# Patient Record
Sex: FEMALE | Race: BLACK OR AFRICAN AMERICAN | NOT HISPANIC OR LATINO | Employment: FULL TIME | URBAN - METROPOLITAN AREA
[De-identification: names, ages, dates, MRNs, and addresses within clinical notes are randomized per-mention and may not be internally consistent; named-entity substitution may affect disease eponyms.]

---

## 2020-02-20 ENCOUNTER — TRANSCRIBE ORDERS (OUTPATIENT)
Dept: PHYSICAL THERAPY | Facility: CLINIC | Age: 50
End: 2020-02-20

## 2020-02-20 DIAGNOSIS — S46.919A STRAIN OF SHOULDER, UNSPECIFIED LATERALITY, INITIAL ENCOUNTER: ICD-10-CM

## 2020-02-20 DIAGNOSIS — S16.1XXA STRAIN OF NECK MUSCLE, INITIAL ENCOUNTER: Primary | ICD-10-CM

## 2020-02-24 ENCOUNTER — TREATMENT (OUTPATIENT)
Dept: PHYSICAL THERAPY | Facility: CLINIC | Age: 50
End: 2020-02-24

## 2020-02-24 DIAGNOSIS — M54.2 PAIN, NECK: Primary | ICD-10-CM

## 2020-02-24 DIAGNOSIS — M54.9 UPPER BACK PAIN ON LEFT SIDE: ICD-10-CM

## 2020-02-24 DIAGNOSIS — G44.52 NEW DAILY PERSISTENT HEADACHE: ICD-10-CM

## 2020-02-24 DIAGNOSIS — M79.604 LEG PAIN, ANTERIOR, RIGHT: ICD-10-CM

## 2020-02-24 PROCEDURE — 97162 PT EVAL MOD COMPLEX 30 MIN: CPT | Performed by: PHYSICAL THERAPIST

## 2020-02-24 PROCEDURE — 97014 ELECTRIC STIMULATION THERAPY: CPT | Performed by: PHYSICAL THERAPIST

## 2020-02-24 PROCEDURE — 97110 THERAPEUTIC EXERCISES: CPT | Performed by: PHYSICAL THERAPIST
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
## 2020-02-24 NOTE — PATIENT INSTRUCTIONS
Access Code: [email or web address]   URL: [email or web address]   Date: 02/24/2020   Prepared by: Jessenia Mcintosh     Exercises   Seated Scapular Retraction - 10 reps - 1 sets - 5 hold - 2x daily   Seated Upper Trapezius Stretch - 3 reps - 1 sets - 20 hold - 2x daily   Gentle Levator Scapulae Stretch - 3 reps - 1 sets - 20 hold - 2x daily   Modified Sidelying Thoracic Rotation - 10 reps - 1 sets - 10 hold - 2x daily   Supine Lower Trunk Rotation - 20 reps - 1 sets - 2x daily     Patient was educated on findings of evaluation, purpose of treatment, and goals for therapy.  Treatment options discussed and questions answered.  Patient was educated on exercises/self treatment/pain relief techniques.

## 2020-02-26 ENCOUNTER — TREATMENT (OUTPATIENT)
Dept: PHYSICAL THERAPY | Facility: CLINIC | Age: 50
End: 2020-02-26

## 2020-02-26 DIAGNOSIS — M54.9 UPPER BACK PAIN ON LEFT SIDE: ICD-10-CM

## 2020-02-26 DIAGNOSIS — M54.2 PAIN, NECK: Primary | ICD-10-CM

## 2020-02-26 PROCEDURE — 97014 ELECTRIC STIMULATION THERAPY: CPT | Performed by: PHYSICAL THERAPIST

## 2020-02-26 PROCEDURE — 97110 THERAPEUTIC EXERCISES: CPT | Performed by: PHYSICAL THERAPIST

## 2020-02-26 NOTE — PROGRESS NOTES
Physical Therapy Daily Progress Note      Visit # 2      Subjective Evaluation    History of Present Illness    Subjective comment: Pt stated her pain level wa a 7/10 in cervical spine and (L) trap.       Objective   See Exercise, Manual, and Modality Logs for complete treatment.   Added chin tucks    Assessment & Plan     Assessment  Assessment details: Pt tolerated treatment well.  Pt had increased discomfort in cervical spine and (L) upper trap, but was able to complete treatment.  Chin tucks were limited to 5 due to increased pain.  Plan to progress exercises as tolerated.  ( pt was 15 min late for appointment)                     Manual Therapy:   0     mins  68061;  Therapeutic Exercise:    25     mins  97915;     Neuromuscular Daya:    0    mins  60714;    Therapeutic Activity:     0     mins  80783;     Gait Trainin     mins  98978;     Ultrasound:     0     mins  11056;    Work Hardening           0      mins 68334  Iontophoresis               0   mins 46403    Timed Treatment:   25   mins   Total Treatment:     40   mins    Yasmany Hill PTA  Physical Therapist

## 2020-02-27 ENCOUNTER — TREATMENT (OUTPATIENT)
Dept: PHYSICAL THERAPY | Facility: CLINIC | Age: 50
End: 2020-02-27

## 2020-02-27 DIAGNOSIS — M54.9 UPPER BACK PAIN ON LEFT SIDE: ICD-10-CM

## 2020-02-27 DIAGNOSIS — M54.2 PAIN, NECK: Primary | ICD-10-CM

## 2020-02-27 DIAGNOSIS — M79.604 LEG PAIN, ANTERIOR, RIGHT: ICD-10-CM

## 2020-02-27 PROCEDURE — 97110 THERAPEUTIC EXERCISES: CPT | Performed by: PHYSICAL THERAPIST

## 2020-02-27 PROCEDURE — 97014 ELECTRIC STIMULATION THERAPY: CPT | Performed by: PHYSICAL THERAPIST

## 2020-02-27 NOTE — PROGRESS NOTES
Physical Therapy Daily Progress Note      Visit # 3      Subjective Evaluation    History of Present Illness    Subjective comment: Pt reports pain level is a 4/10 mostly on the (R) side. She had on pain when she woke up.  Pain started while in the shower.       Objective   See Exercise, Manual, and Modality Logs for complete treatment.   Added TBand row    Assessment & Plan     Assessment  Assessment details: Pt completed treatment with slight increase in pain in (L) scapula.  Pain started when doing scap retractions.  She tolerated addition of TBand rows with no issue.  Plan to progress strengthening as tolerated.                     Manual Therapy:    0     mins  23025;  Therapeutic Exercise:    30     mins  38765;     Neuromuscular Daya:    0    mins  73756;    Therapeutic Activity:     0     mins  65849;     Gait Trainin     mins  91937;     Ultrasound:     0     mins  08807;    Work Hardening           0      mins 51436  Iontophoresis               0   mins 83470    Timed Treatment:   30   mins   Total Treatment:     55   mins    Yasmany Hill PTA  Physical Therapist

## 2020-03-02 ENCOUNTER — TREATMENT (OUTPATIENT)
Dept: PHYSICAL THERAPY | Facility: CLINIC | Age: 50
End: 2020-03-02

## 2020-03-02 DIAGNOSIS — M79.604 LEG PAIN, ANTERIOR, RIGHT: ICD-10-CM

## 2020-03-02 DIAGNOSIS — M54.2 PAIN, NECK: Primary | ICD-10-CM

## 2020-03-02 DIAGNOSIS — M54.9 UPPER BACK PAIN ON LEFT SIDE: ICD-10-CM

## 2020-03-02 PROCEDURE — 97014 ELECTRIC STIMULATION THERAPY: CPT | Performed by: PHYSICAL THERAPIST

## 2020-03-02 PROCEDURE — 97110 THERAPEUTIC EXERCISES: CPT | Performed by: PHYSICAL THERAPIST

## 2020-03-02 NOTE — PROGRESS NOTES
Physical Therapy Daily Progress Note      Visit # 4      Subjective Evaluation    History of Present Illness    Subjective comment: Pt reports that she currently has a pain rating of 0/10.       Objective   See Exercise, Manual, and Modality Logs for complete treatment.   Added wall slides    Assessment & Plan     Assessment  Assessment details: Pt completed treatment with a slight increase in discomfort in upper trap.  She had complaints of pain in UT as she finished wall slides. Plan to progress strengthening as tolerated.                     Manual Therapy:    0     mins  03929;  Therapeutic Exercise:    40     mins  50959;     Neuromuscular Daya:    0    mins  09502;    Therapeutic Activity:     0     mins  16433;     Gait Trainin     mins  90607;     Ultrasound:     0     mins  28434;    Work Hardening           0      mins 62035  Iontophoresis               0   mins 99664    Timed Treatment:   40   mins   Total Treatment:     55   mins    Yasmany Hill PTA  Physical Therapist

## 2020-03-04 ENCOUNTER — TREATMENT (OUTPATIENT)
Dept: PHYSICAL THERAPY | Facility: CLINIC | Age: 50
End: 2020-03-04

## 2020-03-04 DIAGNOSIS — M79.604 LEG PAIN, ANTERIOR, RIGHT: ICD-10-CM

## 2020-03-04 DIAGNOSIS — M54.2 PAIN, NECK: Primary | ICD-10-CM

## 2020-03-04 DIAGNOSIS — M54.9 UPPER BACK PAIN ON LEFT SIDE: ICD-10-CM

## 2020-03-04 PROCEDURE — 97110 THERAPEUTIC EXERCISES: CPT | Performed by: PHYSICAL THERAPIST

## 2020-03-04 PROCEDURE — 97014 ELECTRIC STIMULATION THERAPY: CPT | Performed by: PHYSICAL THERAPIST

## 2020-03-04 NOTE — PROGRESS NOTES
Physical Therapy Daily Progress Note      Visit # 5      Subjective Evaluation    History of Present Illness    Subjective comment: Pt reports that she is feeling good today.  No headache this morning.       Objective   See Exercise, Manual, and Modality Logs for complete treatment.       Assessment & Plan     Assessment  Assessment details: Pt tolerated treatment well.  She did have a slight increase in pain when doing her prescribed exercise , but was able to complete them.  Overall she has had decrease in pain with therapeutic exercise over her 5 treatments.  Plan to progress as tolerated.                     Manual Therapy:    0     mins  42705;  Therapeutic Exercise:    23     mins  12810;     Neuromuscular Daya:    0    mins  77868;    Therapeutic Activity:     0     mins  97784;     Gait Trainin     mins  07551;     Ultrasound:     0     mins  57583;    Work Hardening           0      mins 95644  Iontophoresis               0   mins 26471    Timed Treatment:   23   mins   Total Treatment:     40   mins    Yasmany Hill PTA  Physical Therapist

## 2024-12-05 ENCOUNTER — HOSPITAL ENCOUNTER (OUTPATIENT)
Facility: HOSPITAL | Age: 54
Setting detail: OBSERVATION
LOS: 1 days | Discharge: HOME | End: 2024-12-07
Attending: INTERNAL MEDICINE | Admitting: HOSPITALIST
Payer: COMMERCIAL

## 2024-12-05 PROBLEM — K92.2 GI BLEED: Status: ACTIVE | Noted: 2024-12-05

## 2024-12-05 LAB
ABO GROUP BLD: NORMAL
ALBUMIN SERPL-MCNC: 3.6 G/DL (ref 3.5–5.2)
ALBUMIN/GLOB SERPL: 1.6 G/DL
ALP SERPL-CCNC: 57 U/L (ref 39–117)
ALT SERPL W P-5'-P-CCNC: 18 U/L (ref 1–33)
ANION GAP SERPL CALCULATED.3IONS-SCNC: 11 MMOL/L (ref 5–15)
AST SERPL-CCNC: 19 U/L (ref 1–32)
BASOPHILS # BLD AUTO: 0.04 10*3/MM3 (ref 0–0.2)
BASOPHILS NFR BLD AUTO: 0.3 % (ref 0–1.5)
BILIRUB SERPL-MCNC: 0.5 MG/DL (ref 0–1.2)
BLD GP AB SCN SERPL QL: NEGATIVE
BUN SERPL-MCNC: 41 MG/DL (ref 6–20)
BUN/CREAT SERPL: 15.6 (ref 7–25)
CALCIUM SPEC-SCNC: 8.4 MG/DL (ref 8.6–10.5)
CHLORIDE SERPL-SCNC: 107 MMOL/L (ref 98–107)
CO2 SERPL-SCNC: 21 MMOL/L (ref 22–29)
CREAT SERPL-MCNC: 2.62 MG/DL (ref 0.57–1)
DEPRECATED RDW RBC AUTO: 43.7 FL (ref 37–54)
EGFRCR SERPLBLD CKD-EPI 2021: 21 ML/MIN/1.73
EOSINOPHIL # BLD AUTO: 0.07 10*3/MM3 (ref 0–0.4)
EOSINOPHIL NFR BLD AUTO: 0.6 % (ref 0.3–6.2)
ERYTHROCYTE [DISTWIDTH] IN BLOOD BY AUTOMATED COUNT: 13.1 % (ref 12.3–15.4)
GLOBULIN UR ELPH-MCNC: 2.2 GM/DL
GLUCOSE BLDC GLUCOMTR-MCNC: 165 MG/DL (ref 70–130)
GLUCOSE BLDC GLUCOMTR-MCNC: 172 MG/DL (ref 70–130)
GLUCOSE SERPL-MCNC: 173 MG/DL (ref 65–99)
HCT VFR BLD AUTO: 29.6 % (ref 34–46.6)
HGB BLD-MCNC: 9.6 G/DL (ref 12–15.9)
IMM GRANULOCYTES # BLD AUTO: 0.09 10*3/MM3 (ref 0–0.05)
IMM GRANULOCYTES NFR BLD AUTO: 0.7 % (ref 0–0.5)
INR PPP: 1.19 (ref 0.9–1.1)
LYMPHOCYTES # BLD AUTO: 2.26 10*3/MM3 (ref 0.7–3.1)
LYMPHOCYTES NFR BLD AUTO: 18.1 % (ref 19.6–45.3)
MCH RBC QN AUTO: 29.6 PG (ref 26.6–33)
MCHC RBC AUTO-ENTMCNC: 32.4 G/DL (ref 31.5–35.7)
MCV RBC AUTO: 91.4 FL (ref 79–97)
MONOCYTES # BLD AUTO: 0.92 10*3/MM3 (ref 0.1–0.9)
MONOCYTES NFR BLD AUTO: 7.3 % (ref 5–12)
NEUTROPHILS NFR BLD AUTO: 73 % (ref 42.7–76)
NEUTROPHILS NFR BLD AUTO: 9.14 10*3/MM3 (ref 1.7–7)
NRBC BLD AUTO-RTO: 0 /100 WBC (ref 0–0.2)
PLATELET # BLD AUTO: 243 10*3/MM3 (ref 140–450)
PMV BLD AUTO: 10.9 FL (ref 6–12)
POTASSIUM SERPL-SCNC: 4.1 MMOL/L (ref 3.5–5.2)
PROT SERPL-MCNC: 5.8 G/DL (ref 6–8.5)
PROTHROMBIN TIME: 15.4 SECONDS (ref 11.7–14.2)
RBC # BLD AUTO: 3.24 10*6/MM3 (ref 3.77–5.28)
RH BLD: POSITIVE
SODIUM SERPL-SCNC: 139 MMOL/L (ref 136–145)
T&S EXPIRATION DATE: NORMAL
WBC NRBC COR # BLD AUTO: 12.52 10*3/MM3 (ref 3.4–10.8)

## 2024-12-05 PROCEDURE — 85025 COMPLETE CBC W/AUTO DIFF WBC: CPT | Performed by: HOSPITALIST

## 2024-12-05 PROCEDURE — 25810000003 SODIUM CHLORIDE 0.9 % SOLUTION: Performed by: HOSPITALIST

## 2024-12-05 PROCEDURE — 86850 RBC ANTIBODY SCREEN: CPT | Performed by: HOSPITALIST

## 2024-12-05 PROCEDURE — 63710000001 INSULIN GLARGINE PER 5 UNITS: Performed by: HOSPITALIST

## 2024-12-05 PROCEDURE — 99204 OFFICE O/P NEW MOD 45 MIN: CPT | Performed by: PHYSICIAN ASSISTANT

## 2024-12-05 PROCEDURE — 85610 PROTHROMBIN TIME: CPT | Performed by: HOSPITALIST

## 2024-12-05 PROCEDURE — 63710000001 INSULIN REGULAR HUMAN PER 5 UNITS: Performed by: HOSPITALIST

## 2024-12-05 PROCEDURE — 82948 REAGENT STRIP/BLOOD GLUCOSE: CPT

## 2024-12-05 PROCEDURE — 86900 BLOOD TYPING SEROLOGIC ABO: CPT | Performed by: HOSPITALIST

## 2024-12-05 PROCEDURE — 96374 THER/PROPH/DIAG INJ IV PUSH: CPT

## 2024-12-05 PROCEDURE — 86901 BLOOD TYPING SEROLOGIC RH(D): CPT | Performed by: HOSPITALIST

## 2024-12-05 PROCEDURE — G0378 HOSPITAL OBSERVATION PER HR: HCPCS

## 2024-12-05 PROCEDURE — 80053 COMPREHEN METABOLIC PANEL: CPT | Performed by: HOSPITALIST

## 2024-12-05 RX ORDER — HYDRALAZINE HYDROCHLORIDE 10 MG/1
100 TABLET, FILM COATED ORAL 2 TIMES DAILY
COMMUNITY

## 2024-12-05 RX ORDER — ONDANSETRON 4 MG/1
4 TABLET, ORALLY DISINTEGRATING ORAL EVERY 6 HOURS PRN
Status: DISCONTINUED | OUTPATIENT
Start: 2024-12-05 | End: 2024-12-07 | Stop reason: HOSPADM

## 2024-12-05 RX ORDER — ACETAMINOPHEN 160 MG/5ML
650 SOLUTION ORAL EVERY 4 HOURS PRN
Status: DISCONTINUED | OUTPATIENT
Start: 2024-12-05 | End: 2024-12-07 | Stop reason: HOSPADM

## 2024-12-05 RX ORDER — NITROGLYCERIN 0.4 MG/1
0.4 TABLET SUBLINGUAL
Status: DISCONTINUED | OUTPATIENT
Start: 2024-12-05 | End: 2024-12-07 | Stop reason: HOSPADM

## 2024-12-05 RX ORDER — SODIUM CHLORIDE 9 MG/ML
40 INJECTION, SOLUTION INTRAVENOUS AS NEEDED
Status: DISCONTINUED | OUTPATIENT
Start: 2024-12-05 | End: 2024-12-07 | Stop reason: HOSPADM

## 2024-12-05 RX ORDER — CARVEDILOL 12.5 MG/1
12.5 TABLET ORAL 2 TIMES DAILY WITH MEALS
Status: DISCONTINUED | OUTPATIENT
Start: 2024-12-05 | End: 2024-12-07 | Stop reason: HOSPADM

## 2024-12-05 RX ORDER — AMLODIPINE BESYLATE 10 MG/1
10 TABLET ORAL DAILY
Status: DISCONTINUED | OUTPATIENT
Start: 2024-12-05 | End: 2024-12-07 | Stop reason: HOSPADM

## 2024-12-05 RX ORDER — ONDANSETRON 2 MG/ML
4 INJECTION INTRAMUSCULAR; INTRAVENOUS EVERY 6 HOURS PRN
Status: DISCONTINUED | OUTPATIENT
Start: 2024-12-05 | End: 2024-12-07 | Stop reason: HOSPADM

## 2024-12-05 RX ORDER — DIPHENOXYLATE HYDROCHLORIDE AND ATROPINE SULFATE 2.5; .025 MG/1; MG/1
1 TABLET ORAL DAILY
COMMUNITY

## 2024-12-05 RX ORDER — ALLOPURINOL 100 MG/1
100 TABLET ORAL DAILY
COMMUNITY

## 2024-12-05 RX ORDER — PANTOPRAZOLE SODIUM 40 MG/10ML
40 INJECTION, POWDER, LYOPHILIZED, FOR SOLUTION INTRAVENOUS EVERY 12 HOURS SCHEDULED
Status: DISCONTINUED | OUTPATIENT
Start: 2024-12-05 | End: 2024-12-06

## 2024-12-05 RX ORDER — AMLODIPINE BESYLATE 10 MG/1
10 TABLET ORAL DAILY
COMMUNITY

## 2024-12-05 RX ORDER — LEVOTHYROXINE SODIUM 150 UG/1
150 TABLET ORAL
COMMUNITY

## 2024-12-05 RX ORDER — SODIUM CHLORIDE 9 MG/ML
100 INJECTION, SOLUTION INTRAVENOUS CONTINUOUS
Status: DISCONTINUED | OUTPATIENT
Start: 2024-12-05 | End: 2024-12-06

## 2024-12-05 RX ORDER — FUROSEMIDE 20 MG/1
20 TABLET ORAL DAILY
COMMUNITY

## 2024-12-05 RX ORDER — SPIRONOLACTONE 50 MG/1
50 TABLET, FILM COATED ORAL DAILY
COMMUNITY

## 2024-12-05 RX ORDER — ACETAMINOPHEN 325 MG/1
650 TABLET ORAL EVERY 4 HOURS PRN
Status: DISCONTINUED | OUTPATIENT
Start: 2024-12-05 | End: 2024-12-07 | Stop reason: HOSPADM

## 2024-12-05 RX ORDER — CARVEDILOL 12.5 MG/1
12.5 TABLET ORAL 2 TIMES DAILY WITH MEALS
COMMUNITY

## 2024-12-05 RX ORDER — ASPIRIN 81 MG/1
81 TABLET ORAL DAILY
Status: ON HOLD | COMMUNITY
End: 2024-12-07

## 2024-12-05 RX ORDER — ROSUVASTATIN CALCIUM 5 MG/1
5 TABLET, COATED ORAL DAILY
COMMUNITY

## 2024-12-05 RX ORDER — ROSUVASTATIN CALCIUM 5 MG/1
5 TABLET, COATED ORAL DAILY
Status: DISCONTINUED | OUTPATIENT
Start: 2024-12-05 | End: 2024-12-07 | Stop reason: HOSPADM

## 2024-12-05 RX ORDER — CALCITRIOL 0.5 UG/1
0.5 CAPSULE, LIQUID FILLED ORAL DAILY
Status: DISCONTINUED | OUTPATIENT
Start: 2024-12-05 | End: 2024-12-07 | Stop reason: HOSPADM

## 2024-12-05 RX ORDER — IBUPROFEN 600 MG/1
1 TABLET ORAL
Status: DISCONTINUED | OUTPATIENT
Start: 2024-12-05 | End: 2024-12-07 | Stop reason: HOSPADM

## 2024-12-05 RX ORDER — CYCLOBENZAPRINE HCL 10 MG
10 TABLET ORAL 3 TIMES DAILY PRN
Status: DISCONTINUED | OUTPATIENT
Start: 2024-12-05 | End: 2024-12-07 | Stop reason: HOSPADM

## 2024-12-05 RX ORDER — DEXTROSE MONOHYDRATE 25 G/50ML
25 INJECTION, SOLUTION INTRAVENOUS
Status: DISCONTINUED | OUTPATIENT
Start: 2024-12-05 | End: 2024-12-07 | Stop reason: HOSPADM

## 2024-12-05 RX ORDER — DULAGLUTIDE 0.75 MG/.5ML
0.75 INJECTION, SOLUTION SUBCUTANEOUS WEEKLY
COMMUNITY

## 2024-12-05 RX ORDER — ALLOPURINOL 100 MG/1
100 TABLET ORAL DAILY
Status: DISCONTINUED | OUTPATIENT
Start: 2024-12-05 | End: 2024-12-07 | Stop reason: HOSPADM

## 2024-12-05 RX ORDER — CALCITRIOL 0.5 UG/1
0.5 CAPSULE, LIQUID FILLED ORAL DAILY
COMMUNITY

## 2024-12-05 RX ORDER — ACETAMINOPHEN 650 MG/1
650 SUPPOSITORY RECTAL EVERY 4 HOURS PRN
Status: DISCONTINUED | OUTPATIENT
Start: 2024-12-05 | End: 2024-12-07 | Stop reason: HOSPADM

## 2024-12-05 RX ORDER — SODIUM CHLORIDE 0.9 % (FLUSH) 0.9 %
10 SYRINGE (ML) INJECTION AS NEEDED
Status: DISCONTINUED | OUTPATIENT
Start: 2024-12-05 | End: 2024-12-07 | Stop reason: HOSPADM

## 2024-12-05 RX ORDER — HYDRALAZINE HYDROCHLORIDE 50 MG/1
100 TABLET, FILM COATED ORAL 2 TIMES DAILY
Status: DISCONTINUED | OUTPATIENT
Start: 2024-12-05 | End: 2024-12-07 | Stop reason: HOSPADM

## 2024-12-05 RX ORDER — SODIUM CHLORIDE 0.9 % (FLUSH) 0.9 %
10 SYRINGE (ML) INJECTION EVERY 12 HOURS SCHEDULED
Status: DISCONTINUED | OUTPATIENT
Start: 2024-12-05 | End: 2024-12-07 | Stop reason: HOSPADM

## 2024-12-05 RX ORDER — LEVOTHYROXINE SODIUM 150 UG/1
150 TABLET ORAL
Status: DISCONTINUED | OUTPATIENT
Start: 2024-12-06 | End: 2024-12-07 | Stop reason: HOSPADM

## 2024-12-05 RX ORDER — NICOTINE POLACRILEX 4 MG
15 LOZENGE BUCCAL
Status: DISCONTINUED | OUTPATIENT
Start: 2024-12-05 | End: 2024-12-07 | Stop reason: HOSPADM

## 2024-12-05 RX ORDER — FLUTICASONE PROPIONATE 50 MCG
2 SPRAY, SUSPENSION (ML) NASAL AS NEEDED
COMMUNITY

## 2024-12-05 RX ORDER — CYCLOBENZAPRINE HCL 10 MG
10 TABLET ORAL 3 TIMES DAILY PRN
COMMUNITY

## 2024-12-05 RX ADMIN — HYDRALAZINE HYDROCHLORIDE 100 MG: 50 TABLET ORAL at 21:45

## 2024-12-05 RX ADMIN — INSULIN GLARGINE 10 UNITS: 100 INJECTION, SOLUTION SUBCUTANEOUS at 21:46

## 2024-12-05 RX ADMIN — ROSUVASTATIN CALCIUM 5 MG: 5 TABLET, FILM COATED ORAL at 18:11

## 2024-12-05 RX ADMIN — Medication 10 ML: at 21:46

## 2024-12-05 RX ADMIN — INSULIN HUMAN 2 UNITS: 100 INJECTION, SOLUTION PARENTERAL at 18:11

## 2024-12-05 RX ADMIN — CARVEDILOL 12.5 MG: 12.5 TABLET, FILM COATED ORAL at 16:37

## 2024-12-05 RX ADMIN — SODIUM CHLORIDE 100 ML/HR: 9 INJECTION, SOLUTION INTRAVENOUS at 16:38

## 2024-12-05 RX ADMIN — CALCITRIOL 0.5 MCG: 0.5 CAPSULE, LIQUID FILLED ORAL at 18:11

## 2024-12-05 RX ADMIN — ALLOPURINOL 100 MG: 100 TABLET ORAL at 16:37

## 2024-12-05 RX ADMIN — AMLODIPINE BESYLATE 10 MG: 10 TABLET ORAL at 16:37

## 2024-12-05 RX ADMIN — PANTOPRAZOLE SODIUM 40 MG: 40 INJECTION, POWDER, FOR SOLUTION INTRAVENOUS at 21:45

## 2024-12-05 RX ADMIN — MAGNESIUM OXIDE TAB 400 MG (241.3 MG ELEMENTAL MG) 400 MG: 400 (241.3 MG) TAB at 16:37

## 2024-12-05 NOTE — CONSULTS
"[facility]  Initial Inpatient Consult Note    Referring Provider: MD Wu  Reason for Consultation: Rectal bleeding    Subjective     History of present illness:    54 y.o. female  With a past medical history of hypertension, gout, hypothyroidism, gastroparesis, type 2 diabetes mellitus, MATI, CKD who presents as a transfer from [facility] in the setting of rectal bleeding.    Per reports from the outside hospital patient was found to be anemic.  Patient did have a syncopal episode while there and hit her head.  She did undergo CT head without IV contrast which was unremarkable.  On initial presentation to [facility] her hemoglobin was 11. Upon transfer hemoglobin was down to 7.6. Patient noted to have a mild leukocytosis.    Patient reports starting Tuesday she began experiencing bright red blood per rectum.  She reports this is intermittent throughout the day up until yesterday.  She does report some associated loose stools and abdominal cramping.  She describes it as a menstrual period like cramping.  She is not on any blood thinners.  The only new medication she has been taking is Trulicity.  She denies any ill contacts.  She denies any nausea, vomiting, hematemesis, melena.  Patient did receive a unit of PRBCs prior to transfer here to [facility].    Patient's last colonoscopy was in February of this year with Dr. Anderson.  Findings include moderate diverticulosis of the whole colon, multiple polyps throughout the ascending colon, cecum, sigmoid colon. Per report  colonoscopy report from Dr. Anderson \"RECOMMEND: Because a sessile adenoma was removed in piecemeal fashion, colonoscopy should be repeated in 6 months to clear any remanent lesion and/or biopsy scar. \"  Biopsies consistent with tubular adenomatous changes.      Past Medical History:  Past Medical History:   Diagnosis Date    Allergic     Anxiety     Chronic kidney disease     Diabetes mellitus     Injury of back  "     Past Surgical History:  History reviewed. No pertinent surgical history.   Social History:   Social History     Tobacco Use    Smoking status: Unknown    Smokeless tobacco: Not on file   Substance Use Topics    Alcohol use: Not Currently      Family History:  History reviewed. No pertinent family history.    Home Meds:  Medications Prior to Admission   Medication Sig Dispense Refill Last Dose/Taking    allopurinol (ZYLOPRIM) 100 MG tablet Take 1 tablet by mouth Daily.   Past Week    amLODIPine (NORVASC) 10 MG tablet Take 1 tablet by mouth Daily.   Past Week    aspirin 81 MG EC tablet Take 1 tablet by mouth Daily.   Past Week    calcitriol (ROCALTROL) 0.5 MCG capsule Take 1 capsule by mouth Daily.   Past Week    carvedilol (COREG) 12.5 MG tablet Take 1 tablet by mouth 2 (Two) Times a Day With Meals.   Past Week    cyclobenzaprine (FLEXERIL) 10 MG tablet Take 1 tablet by mouth 3 (Three) Times a Day As Needed for Muscle Spasms.   Past Week    Dulaglutide (Trulicity) 0.75 MG/0.5ML solution auto-injector Inject 0.75 mg under the skin into the appropriate area as directed 1 (One) Time Per Week. Last dose Tuesday   Past Week    empagliflozin (JARDIANCE) 25 MG tablet tablet Take 1 tablet by mouth Daily.   Past Week    fluticasone (FLONASE) 50 MCG/ACT nasal spray Administer 2 sprays into the nostril(s) as directed by provider As Needed for Rhinitis.   Past Week    furosemide (LASIX) 20 MG tablet Take 1 tablet by mouth Daily.   Past Week    hydrALAZINE (APRESOLINE) 10 MG tablet Take 10 tablets by mouth 2 (Two) Times a Day.   Past Week    insulin detemir (LEVEMIR) 100 UNIT/ML injection Inject 30 Units under the skin into the appropriate area as directed Every Night.   Past Week    levothyroxine (SYNTHROID, LEVOTHROID) 150 MCG tablet Take 1 tablet by mouth Every Morning.   Past Week    magnesium oxide (MAG-OX) 400 tablet tablet Take 1 tablet by mouth Daily.   Past Week    multivitamin (MULTIPLE VITAMINS ESSENTIAL PO) Take 1  "tablet by mouth Daily.   Past Week    rosuvastatin (CRESTOR) 5 MG tablet Take 1 tablet by mouth Daily.   Past Week    spironolactone (ALDACTONE) 50 MG tablet Take 1 tablet by mouth Daily.   Past Week    Turmeric (QC TUMERIC COMPLEX PO) Take  by mouth.   Past Week     Current Meds:     Allergies:  Allergies   Allergen Reactions    Lisinopril Other (See Comments) and Myalgia     \"Weakness, tired, coughs a lot\"       Objective     Vital Signs  Temp:  [98.1 °F (36.7 °C)] 98.1 °F (36.7 °C)  Heart Rate:  [88] 88  Resp:  [16] 16  BP: (140)/(85) 140/85  Physical Exam:  General Appearance:     Alert, cooperative, in no acute distress   Abdomen:     Normal bowel sounds, no masses, no organomegaly, soft     nontender, nondistended, no guarding, no rebound                 tenderness   Rectal:     Deferred       Results Review:   I reviewed the patient's new clinical results.  I reviewed the patient's new imaging results and agree with the interpretation.    Results from last 7 days   Lab Units 12/05/24  0448   HEMOGLOBIN Gram/dL 8.4*   HEMATOCRIT % 25.3*           Invalid input(s): \"LABALBU\", \"PROT\"      Lab Results   Lab Value Date/Time    LIPASE 27 05/16/2023 1049    LIPASE 113 02/04/2020 1253    LIPASE 91 09/16/2019 1500       Radiology:  No orders to display   CT Abdomen Pelvis WO (12/04/2024 12:18)  Result: Abdominal pain rectal bleeding Comparison: None Exam: CT of the abdomen  and pelvis without contrast Technique: Axial CT imaging of the abdomen and pelvis  without contrast with sagittal and coronal reconstruction. Radiation reduction  techniques utilized. No complications reported. 0 CT scans in the previous 12  months. Findings: Cardiac size normal. No pericardial effusion identified. No  pleural effusion identified. There is a small hiatal hernia. Atelectasis and scarring  noted in the lung bases. Abdomen: Density of the liver normal. No evidence of  cirrhosis. No contour deforming abnormalities identified. No " calcified gallstones are  noted. The pancreas demonstrates no definite abnormalities. The stomach and  duodenum unremarkable. The spleen is unremarkable. The adrenal glands  unremarkable. Kidneys unremarkable. Ureters are decompressed. IVC is  unremarkable. Atherosclerotic calcifications are present in the abdominal aorta.  There is no evidence of large bowel obstruction. No evidence of small-bowel  obstruction. There is colonic diverticulosis. No evidence of diverticulitis. There is  no evidence of inflammation in the region of the appendix. There is a nodularity in  the anterior pelvic wall on series 2, image 65. A measures 1.9 x 2.1 cm. Pelvis:  Anorectal region is unremarkable. Ischiorectal fossa is unremarkable. The urinary  bladder is unremarkable. Uterus has been surgically removed. There is distal  colonic diverticulosis. No evidence of diverticulitis. Body wall: Unremarkable.  Bones: No fracture. No dislocation identified. Degenerative changes. Impression:  No evidence of inflammation in the region of the appendix. Distal colonic  diverticulosis without diverticulitis. No evidence of renal or collecting system calculi  Calcific aortic atherosclerosis No calcified gallstones. Small hiatal hernia. Nonspecific  nodularity in the anterior pelvic wall may be postprocedural in nature however  further characterization with MRI soft tissue mass protocol may be considered. Thisis best seen on series 2, image 65. Dictated by: Rustam Goldsmith M.D. Signed by Rustam Goldsmith M.D. on 12/4/2024 13:07    Assessment & Plan   Assessment:   Rectal bleeding   Acute anemia  Hx of diverticulosis   Hx of colon polyps    Plan:   Based on patient's clinical picture and colonoscopy performed in February of this year, largely suspicious for diverticular bleed.  Bleeding has not occurred since yesterday suggesting possible resolution.  She has also had significant improvement in her hemoglobin up to 9.6 with recent blood  transfusion.  Continue to monitor H&H, transfuse for hemoglobin less than 7  Clear liquid diet for now  If patient has reoccurrence of rectal bleeding we will plan for colonoscopy at that point. Nursing staff has been advised to make patient n.p.o. and provide bowel preparation with provided instructions for [employer].      I discussed the patients findings and my recommendations with patient.    Dictated utilizing Dragon dictation.         Mara Reyes PA-C   [facility]  [address] [place] [address]  Office: [phone]

## 2024-12-05 NOTE — PLAN OF CARE
Goal Outcome Evaluation:     Patient direct admit from [facility]. No bowel movement since admission. Arrived with PICC. No complaints of pain. Ad Marcia. A&Ox4. Plan of care ongoing.

## 2024-12-05 NOTE — H&P
HISTORY AND PHYSICAL   [facility]        Patient Identification:  Name: Floridalma Siegel  Age: 54 y.o.  Sex: female  :  1970  MRN: [id number]                     Primary Care Physician: Claudia Poon APRN    Chief Complaint: Blood per rectum    History of Present Illness:   Pleasant 54-year-old female transferred from a [facility] facility after presenting there with bright red blood per rectum.  Apparently the request was made yesterday sometime but the bed was just recently made available.  She states that before she went to bed day before yesterday she was having bright red blood per rectum.  Apparently this happened again in the middle the night.  It continued through the next day until about 6 PM last evening.  None today.  This is associated with left lower quadrant pain.  No nausea or vomiting.  No fever sweats or chills.  Presently feeling better again.  She has fairly recently had a colonoscopy with a history of benign polyps.    Past Medical History:  Past Medical History:   Diagnosis Date    Allergic     Anxiety     Chronic kidney disease     Diabetes mellitus     Injury of back      Past Surgical History:  History reviewed. No pertinent surgical history.   Home Meds:  Medications Prior to Admission   Medication Sig Dispense Refill Last Dose/Taking    allopurinol (ZYLOPRIM) 100 MG tablet Take 1 tablet by mouth Daily.   Past Week    amLODIPine (NORVASC) 10 MG tablet Take 1 tablet by mouth Daily.   Past Week    aspirin 81 MG EC tablet Take 1 tablet by mouth Daily.   Past Week    calcitriol (ROCALTROL) 0.5 MCG capsule Take 1 capsule by mouth Daily.   Past Week    carvedilol (COREG) 12.5 MG tablet Take 1 tablet by mouth 2 (Two) Times a Day With Meals.   Past Week    cyclobenzaprine (FLEXERIL) 10 MG tablet Take 1 tablet by mouth 3 (Three) Times a Day As Needed for Muscle Spasms.   Past Week    Dulaglutide (Trulicity) 0.75 MG/0.5ML solution auto-injector Inject 0.75 mg under the skin into  "the appropriate area as directed 1 (One) Time Per Week. Last dose Tuesday   Past Week    empagliflozin (JARDIANCE) 25 MG tablet tablet Take 1 tablet by mouth Daily.   Past Week    fluticasone (FLONASE) 50 MCG/ACT nasal spray Administer 2 sprays into the nostril(s) as directed by provider As Needed for Rhinitis.   Past Week    furosemide (LASIX) 20 MG tablet Take 1 tablet by mouth Daily.   Past Week    hydrALAZINE (APRESOLINE) 10 MG tablet Take 10 tablets by mouth 2 (Two) Times a Day.   Past Week    insulin detemir (LEVEMIR) 100 UNIT/ML injection Inject 30 Units under the skin into the appropriate area as directed Every Night.   Past Week    levothyroxine (SYNTHROID, LEVOTHROID) 150 MCG tablet Take 1 tablet by mouth Every Morning.   Past Week    magnesium oxide (MAG-OX) 400 tablet tablet Take 1 tablet by mouth Daily.   Past Week    multivitamin (MULTIPLE VITAMINS ESSENTIAL PO) Take 1 tablet by mouth Daily.   Past Week    rosuvastatin (CRESTOR) 5 MG tablet Take 1 tablet by mouth Daily.   Past Week    spironolactone (ALDACTONE) 50 MG tablet Take 1 tablet by mouth Daily.   Past Week    Turmeric (QC TUMERIC COMPLEX PO) Take  by mouth.   Past Week       Allergies:  Allergies   Allergen Reactions    Lisinopril Other (See Comments) and Myalgia     \"Weakness, tired, coughs a lot\"     Immunizations:  Immunization History   Administered Date(s) Administered    COVID-19 (PFIZER) Purple Cap Monovalent 09/13/2021, 10/04/2021     Social History:   Social History     Social History Narrative    Not on file     Social History     Tobacco Use    Smoking status: Unknown    Smokeless tobacco: Not on file   Substance Use Topics    Alcohol use: Not Currently     Family History:  History reviewed. No pertinent family history.     Review of Systems  Review of Systems   Constitutional: Negative.    HENT: Negative.     Eyes: Negative.    Respiratory: Negative.     Cardiovascular: Negative.    Gastrointestinal:         As per history of " present illness.   Endocrine: Negative.    Genitourinary: Negative.    Musculoskeletal: Negative.    Skin: Negative.    Allergic/Immunologic: Negative.    Neurological: Negative.    Hematological: Negative.    Psychiatric/Behavioral: Negative.         Objective:  T Max 24 hrs: Temp (24hrs), Av.1 °F (36.7 °C), Min:98.1 °F (36.7 °C), Max:98.1 °F (36.7 °C)    Vitals Ranges:   Temp:  [98.1 °F (36.7 °C)] 98.1 °F (36.7 °C)  Heart Rate:  [88] 88  Resp:  [16] 16  BP: (140)/(85) 140/85      Exam:  Physical Exam  Constitutional:       General: She is not in acute distress.     Appearance: Normal appearance. She is obese. She is not ill-appearing, toxic-appearing or diaphoretic.   HENT:      Head: Normocephalic and atraumatic.      Right Ear: External ear normal.      Left Ear: External ear normal.      Nose: Nose normal.      Mouth/Throat:      Mouth: Mucous membranes are moist.   Eyes:      General: No scleral icterus.        Right eye: No discharge.         Left eye: No discharge.      Extraocular Movements: Extraocular movements intact.      Conjunctiva/sclera: Conjunctivae normal.   Cardiovascular:      Rate and Rhythm: Normal rate and regular rhythm.      Pulses: Normal pulses.      Heart sounds: Normal heart sounds.   Pulmonary:      Effort: Pulmonary effort is normal.      Breath sounds: Normal breath sounds.   Abdominal:      General: Abdomen is flat. Bowel sounds are normal. There is no distension.      Palpations: Abdomen is soft. There is no mass.      Tenderness: There is no abdominal tenderness. There is no guarding or rebound.   Musculoskeletal:      Cervical back: Neck supple.      Right lower leg: Edema present.      Left lower leg: Edema present.      Comments: Trace pretibial pitting edema bilaterally.  She states this is her baseline.   Skin:     General: Skin is warm and dry.   Neurological:      General: No focal deficit present.      Mental Status: She is alert and oriented to person, place, and  time.   Psychiatric:         Mood and Affect: Mood normal.         Behavior: Behavior normal.         Thought Content: Thought content normal.         Judgment: Judgment normal.         Data Review:  All labs and radiology reviewed.    Assessment:  Lower GI bleed: Per history suspect diverticular bleed.  Very likely resolved at this point.  Recheck hemoglobin and monitor.  GI consultation.  N.p.o. with IV fluids for the time being.  Hold aspirin for the time being.  Hypertension: Continue home meds.  Monitor.  CKD 3: Recheck labs.  Monitor.  Avoid nephrotoxins.  Diabetes-insulin requiring.  Monitor with SSI.  Hypothyroid: Continue levothyroxine.  Obesity class I:      Plan:  Please see above.  Discussed with patient and family member at bedside.  Discussed with RN.  Discussed with EMILY Washburn MD  12/5/2024  14:57 EST    EMR Dragon/Transcription disclaimer:   Much of this encounter note is an electronic transcription/translation of spoken language to printed text. The electronic translation of spoken language may permit erroneous, or at times, nonsensical words or phrases to be inadvertently transcribed; Although I have reviewed the note for such errors, some may still exist.

## 2024-12-05 NOTE — LETTER
December 7, 2024     Patient: Floridalma Siegel   YOB: 1970   Date of Visit: 12/4/2024       To Whom It May Concern:     Floridalma Siegel was an inpatient at [facility] from 12/5/24-12/7/24..           Sincerely,        For Dr. TUAN Rob

## 2024-12-06 PROBLEM — E78.5 HYPERLIPIDEMIA: Status: ACTIVE | Noted: 2024-12-06

## 2024-12-06 PROBLEM — K92.2 LOWER GI BLEED: Status: ACTIVE | Noted: 2024-12-06

## 2024-12-06 PROBLEM — I10 ESSENTIAL HYPERTENSION: Status: ACTIVE | Noted: 2024-12-06

## 2024-12-06 PROBLEM — K21.9 GERD WITHOUT ESOPHAGITIS: Status: ACTIVE | Noted: 2024-12-06

## 2024-12-06 PROBLEM — E11.9 TYPE 2 DIABETES MELLITUS: Status: ACTIVE | Noted: 2024-12-06

## 2024-12-06 LAB
ANION GAP SERPL CALCULATED.3IONS-SCNC: 7 MMOL/L (ref 5–15)
BASOPHILS # BLD AUTO: 0.07 10*3/MM3 (ref 0–0.2)
BASOPHILS NFR BLD AUTO: 0.6 % (ref 0–1.5)
BUN SERPL-MCNC: 34 MG/DL (ref 6–20)
BUN/CREAT SERPL: 14.3 (ref 7–25)
CALCIUM SPEC-SCNC: 8.3 MG/DL (ref 8.6–10.5)
CHLORIDE SERPL-SCNC: 109 MMOL/L (ref 98–107)
CO2 SERPL-SCNC: 22 MMOL/L (ref 22–29)
CREAT SERPL-MCNC: 2.38 MG/DL (ref 0.57–1)
DEPRECATED RDW RBC AUTO: 42.4 FL (ref 37–54)
EGFRCR SERPLBLD CKD-EPI 2021: 23.6 ML/MIN/1.73
EOSINOPHIL # BLD AUTO: 0.19 10*3/MM3 (ref 0–0.4)
EOSINOPHIL NFR BLD AUTO: 1.6 % (ref 0.3–6.2)
ERYTHROCYTE [DISTWIDTH] IN BLOOD BY AUTOMATED COUNT: 13.1 % (ref 12.3–15.4)
GLUCOSE BLDC GLUCOMTR-MCNC: 124 MG/DL (ref 70–130)
GLUCOSE BLDC GLUCOMTR-MCNC: 146 MG/DL (ref 70–130)
GLUCOSE BLDC GLUCOMTR-MCNC: 182 MG/DL (ref 70–130)
GLUCOSE BLDC GLUCOMTR-MCNC: 215 MG/DL (ref 70–130)
GLUCOSE BLDC GLUCOMTR-MCNC: 228 MG/DL (ref 70–130)
GLUCOSE SERPL-MCNC: 126 MG/DL (ref 65–99)
HBA1C MFR BLD: 8.1 % (ref 4.8–5.6)
HCT VFR BLD AUTO: 27.4 % (ref 34–46.6)
HGB BLD-MCNC: 9.2 G/DL (ref 12–15.9)
IMM GRANULOCYTES # BLD AUTO: 0.07 10*3/MM3 (ref 0–0.05)
IMM GRANULOCYTES NFR BLD AUTO: 0.6 % (ref 0–0.5)
LYMPHOCYTES # BLD AUTO: 2.65 10*3/MM3 (ref 0.7–3.1)
LYMPHOCYTES NFR BLD AUTO: 22.8 % (ref 19.6–45.3)
MCH RBC QN AUTO: 29.9 PG (ref 26.6–33)
MCHC RBC AUTO-ENTMCNC: 33.6 G/DL (ref 31.5–35.7)
MCV RBC AUTO: 89 FL (ref 79–97)
MONOCYTES # BLD AUTO: 0.79 10*3/MM3 (ref 0.1–0.9)
MONOCYTES NFR BLD AUTO: 6.8 % (ref 5–12)
NEUTROPHILS NFR BLD AUTO: 67.6 % (ref 42.7–76)
NEUTROPHILS NFR BLD AUTO: 7.83 10*3/MM3 (ref 1.7–7)
NRBC BLD AUTO-RTO: 0 /100 WBC (ref 0–0.2)
PLATELET # BLD AUTO: 259 10*3/MM3 (ref 140–450)
PMV BLD AUTO: 10.2 FL (ref 6–12)
POTASSIUM SERPL-SCNC: 4 MMOL/L (ref 3.5–5.2)
RBC # BLD AUTO: 3.08 10*6/MM3 (ref 3.77–5.28)
SODIUM SERPL-SCNC: 138 MMOL/L (ref 136–145)
WBC NRBC COR # BLD AUTO: 11.6 10*3/MM3 (ref 3.4–10.8)

## 2024-12-06 PROCEDURE — G0378 HOSPITAL OBSERVATION PER HR: HCPCS

## 2024-12-06 PROCEDURE — 96376 TX/PRO/DX INJ SAME DRUG ADON: CPT

## 2024-12-06 PROCEDURE — 82948 REAGENT STRIP/BLOOD GLUCOSE: CPT

## 2024-12-06 PROCEDURE — 63710000001 INSULIN GLARGINE PER 5 UNITS: Performed by: HOSPITALIST

## 2024-12-06 PROCEDURE — 80048 BASIC METABOLIC PNL TOTAL CA: CPT | Performed by: HOSPITALIST

## 2024-12-06 PROCEDURE — 99214 OFFICE O/P EST MOD 30 MIN: CPT | Performed by: INTERNAL MEDICINE

## 2024-12-06 PROCEDURE — 85025 COMPLETE CBC W/AUTO DIFF WBC: CPT | Performed by: HOSPITALIST

## 2024-12-06 PROCEDURE — 63710000001 INSULIN REGULAR HUMAN PER 5 UNITS: Performed by: HOSPITALIST

## 2024-12-06 PROCEDURE — 83036 HEMOGLOBIN GLYCOSYLATED A1C: CPT | Performed by: HOSPITALIST

## 2024-12-06 RX ORDER — SIMETHICONE 80 MG
80 TABLET,CHEWABLE ORAL 4 TIMES DAILY PRN
Status: DISCONTINUED | OUTPATIENT
Start: 2024-12-06 | End: 2024-12-07 | Stop reason: HOSPADM

## 2024-12-06 RX ORDER — PANTOPRAZOLE SODIUM 40 MG/1
40 TABLET, DELAYED RELEASE ORAL
Status: DISCONTINUED | OUTPATIENT
Start: 2024-12-07 | End: 2024-12-07 | Stop reason: HOSPADM

## 2024-12-06 RX ADMIN — INSULIN GLARGINE 10 UNITS: 100 INJECTION, SOLUTION SUBCUTANEOUS at 21:56

## 2024-12-06 RX ADMIN — HYDRALAZINE HYDROCHLORIDE 100 MG: 50 TABLET ORAL at 21:56

## 2024-12-06 RX ADMIN — CARVEDILOL 12.5 MG: 12.5 TABLET, FILM COATED ORAL at 16:54

## 2024-12-06 RX ADMIN — PANTOPRAZOLE SODIUM 40 MG: 40 INJECTION, POWDER, FOR SOLUTION INTRAVENOUS at 08:07

## 2024-12-06 RX ADMIN — CARVEDILOL 12.5 MG: 12.5 TABLET, FILM COATED ORAL at 08:08

## 2024-12-06 RX ADMIN — INSULIN HUMAN 4 UNITS: 100 INJECTION, SOLUTION PARENTERAL at 11:56

## 2024-12-06 RX ADMIN — HYDRALAZINE HYDROCHLORIDE 100 MG: 50 TABLET ORAL at 08:08

## 2024-12-06 RX ADMIN — ROSUVASTATIN CALCIUM 5 MG: 5 TABLET, FILM COATED ORAL at 08:08

## 2024-12-06 RX ADMIN — LEVOTHYROXINE SODIUM 150 MCG: 0.15 TABLET ORAL at 06:50

## 2024-12-06 RX ADMIN — CALCITRIOL 0.5 MCG: 0.5 CAPSULE, LIQUID FILLED ORAL at 08:08

## 2024-12-06 RX ADMIN — Medication 10 ML: at 08:08

## 2024-12-06 RX ADMIN — Medication 10 ML: at 21:56

## 2024-12-06 RX ADMIN — AMLODIPINE BESYLATE 10 MG: 10 TABLET ORAL at 08:08

## 2024-12-06 RX ADMIN — MAGNESIUM OXIDE TAB 400 MG (241.3 MG ELEMENTAL MG) 400 MG: 400 (241.3 MG) TAB at 08:09

## 2024-12-06 RX ADMIN — ALLOPURINOL 100 MG: 100 TABLET ORAL at 08:07

## 2024-12-06 NOTE — PLAN OF CARE
Problem: Adult Inpatient Plan of Care  Goal: Plan of Care Review  Outcome: Progressing  Flowsheets (Taken 12/6/2024 1439)  Progress: improving  Outcome Evaluation: Patient has been pleasant and cooperative during shift. AOx4, ad elizabeth, room air, SR. No complaints of pain, nausea or SOA. GI soft diet started. HGB stable. Discharge tomorrow if no blood in her stool. IVFs infusing. Will continue to monitor and assist patient as needed.  Plan of Care Reviewed With: patient  Goal: Patient-Specific Goal (Individualized)  Outcome: Progressing  Goal: Absence of Hospital-Acquired Illness or Injury  Outcome: Progressing  Intervention: Identify and Manage Fall Risk  Recent Flowsheet Documentation  Taken 12/6/2024 1345 by Herb Galicia RN  Safety Promotion/Fall Prevention:   assistive device/personal items within reach   fall prevention program maintained   nonskid shoes/slippers when out of bed   safety round/check completed  Taken 12/6/2024 1200 by Herb Galicia RN  Safety Promotion/Fall Prevention:   assistive device/personal items within reach   fall prevention program maintained   nonskid shoes/slippers when out of bed   safety round/check completed  Taken 12/6/2024 1000 by Herb Galicia RN  Safety Promotion/Fall Prevention:   assistive device/personal items within reach   fall prevention program maintained   nonskid shoes/slippers when out of bed   safety round/check completed  Taken 12/6/2024 0810 by Herb Galicia RN  Safety Promotion/Fall Prevention:   assistive device/personal items within reach   fall prevention program maintained   nonskid shoes/slippers when out of bed   safety round/check completed  Intervention: Prevent Skin Injury  Recent Flowsheet Documentation  Taken 12/6/2024 1345 by Herb Galicia RN  Body Position: supine  Taken 12/6/2024 1200 by Herb Galicia RN  Body Position: dangle, side of bed  Taken 12/6/2024 1000 by Herb Galicia RN  Body Position: supine  Taken 12/6/2024  0810 by Herb Galicia RN  Body Position: sitting up in bed  Goal: Optimal Comfort and Wellbeing  Outcome: Progressing  Goal: Readiness for Transition of Care  Outcome: Progressing   Goal Outcome Evaluation:  Plan of Care Reviewed With: patient        Progress: improving  Outcome Evaluation: Patient has been pleasant and cooperative during shift. AOx4, ad elizabeth, room air, SR. No complaints of pain, nausea or SOA. GI soft diet started. HGB stable. Discharge tomorrow if no blood in her stool. IVFs infusing. Will continue to monitor and assist patient as needed.

## 2024-12-06 NOTE — PLAN OF CARE
Goal Outcome Evaluation:  Plan of Care Reviewed With: patient      Patient resting in bed.VSS.Room air.Denies any pain or SOA.No signs of active bleeding noted this shift.Continues w IVFs.Hgb at 9.2 this time.NSR

## 2024-12-06 NOTE — PROGRESS NOTES
[facility]  Inpatient Progress Note    Reason for Followup:  Hematochezia    Subjective     Interval History:   No further bleeding.  Hb slowly improving    Current Facility-Administered Medications:     acetaminophen (TYLENOL) tablet 650 mg, 650 mg, Oral, Q4H PRN **OR** acetaminophen (TYLENOL) 160 MG/5ML oral solution 650 mg, 650 mg, Oral, Q4H PRN **OR** acetaminophen (TYLENOL) suppository 650 mg, 650 mg, Rectal, Q4H PRN, Rey Washburn MD    allopurinol (ZYLOPRIM) tablet 100 mg, 100 mg, Oral, Daily, Rey Washburn MD, 100 mg at 12/06/24 0807    amLODIPine (NORVASC) tablet 10 mg, 10 mg, Oral, Daily, Rey Washburn MD, 10 mg at 12/06/24 0808    calcitriol (ROCALTROL) capsule 0.5 mcg, 0.5 mcg, Oral, Daily, Rey Washburn MD, 0.5 mcg at 12/06/24 0808    carvedilol (COREG) tablet 12.5 mg, 12.5 mg, Oral, BID With Meals, Rey Washburn MD, 12.5 mg at 12/06/24 0808    cyclobenzaprine (FLEXERIL) tablet 10 mg, 10 mg, Oral, TID PRN, Rey Washburn MD    dextrose (D50W) (25 g/50 mL) IV injection 25 g, 25 g, Intravenous, Q15 Min PRN, Rey Washburn MD    dextrose (GLUTOSE) oral gel 15 g, 15 g, Oral, Q15 Min PRN, Rey Washburn MD    glucagon (GLUCAGEN) injection 1 mg, 1 mg, Intramuscular, Q15 Min PRN, Rye Washburn MD    hydrALAZINE (APRESOLINE) tablet 100 mg, 100 mg, Oral, BID, Rey Washburn MD, 100 mg at 12/06/24 0808    insulin glargine (LANTUS, SEMGLEE) injection 10 Units, 10 Units, Subcutaneous, Nightly, Rey Washburn MD, 10 Units at 12/05/24 2146    insulin regular (humuLIN R,novoLIN R) injection 2-9 Units, 2-9 Units, Subcutaneous, Q6H, Rey Washburn MD, 2 Units at 12/05/24 1811    levothyroxine (SYNTHROID, LEVOTHROID) tablet 150 mcg, 150 mcg, Oral, Q AM, Rey Washburn MD, 150 mcg at 12/06/24 0650    magnesium oxide (MAG-OX) tablet 400 mg, 400 mg, Oral, Daily, Rey Washburn MD, 400 mg at 12/06/24 0809     nitroglycerin (NITROSTAT) SL tablet 0.4 mg, 0.4 mg, Sublingual, Q5 Min PRN, Rey Washburn MD    ondansetron ODT (ZOFRAN-ODT) disintegrating tablet 4 mg, 4 mg, Oral, Q6H PRN **OR** ondansetron (ZOFRAN) injection 4 mg, 4 mg, Intravenous, Q6H PRN, Rey Washburn MD    pantoprazole (PROTONIX) injection 40 mg, 40 mg, Intravenous, Q12H, Rey Washburn MD, 40 mg at 12/06/24 0807    polyethylene glycol (GoLYTELY) solution 4,000 mL, 4,000 mL, Oral, PRN, Mara Reyes PA-C    rosuvastatin (CRESTOR) tablet 5 mg, 5 mg, Oral, Daily, Rey Washburn MD, 5 mg at 12/06/24 0808    simethicone (MYLICON) chewable tablet 80 mg, 80 mg, Oral, 4x Daily PRN, Jannet Rodriguez, APRN    sodium chloride 0.9 % flush 10 mL, 10 mL, Intravenous, Q12H, Rey Washburn MD, 10 mL at 12/06/24 0808    sodium chloride 0.9 % flush 10 mL, 10 mL, Intravenous, PRN, Rey Washburn MD    sodium chloride 0.9 % infusion 40 mL, 40 mL, Intravenous, PRN, Rey Washburn MD    sodium chloride 0.9 % infusion, 100 mL/hr, Intravenous, Continuous, Rey Washburn MD, Last Rate: 100 mL/hr at 12/05/24 1638, 100 mL/hr at 12/05/24 1638    Objective     Vital Signs  Temp:  [97.3 °F (36.3 °C)-98.2 °F (36.8 °C)] 97.3 °F (36.3 °C)  Heart Rate:  [83-89] 88  Resp:  [16] 16  BP: (118-140)/(64-87) 138/87  Body mass index is 34.06 kg/m².    Intake/Output Summary (Last 24 hours) at 12/6/2024 0837  Last data filed at 12/6/2024 0456  Gross per 24 hour   Intake --   Output 300 ml   Net -300 ml     No intake/output data recorded.     Physical Exam:   General: patient awake, alert and cooperative   Abdomen: soft, nontender, nondistended; normal bowel sounds     Results Review:     I reviewed the patient's new clinical results.  I reviewed the patient's new imaging results and agree with the interpretation.    Results from last 7 days   Lab Units 12/06/24  0502 12/05/24  1530 12/05/24  0448   WBC 10*3/mm3 11.60* 12.52*  --     HEMOGLOBIN g/dL 9.2* 9.6* 8.4*   HEMATOCRIT % 27.4* 29.6* 25.3*   PLATELETS 10*3/mm3 259 243  --      Results from last 7 days   Lab Units 12/06/24  0502 12/05/24  1539   SODIUM mmol/L 138 139   POTASSIUM mmol/L 4.0 4.1   CHLORIDE mmol/L 109* 107   CO2 mmol/L 22.0 21.0*   BUN mg/dL 34* 41*   CREATININE mg/dL 2.38* 2.62*   CALCIUM mg/dL 8.3* 8.4*   BILIRUBIN mg/dL  --  0.5   ALK PHOS U/L  --  57   ALT (SGPT) U/L  --  18   AST (SGOT) U/L  --  19   GLUCOSE mg/dL 126* 173*     Results from last 7 days   Lab Units 12/05/24  1616   INR  1.19*     Lab Results   Lab Value Date/Time    LIPASE 27 05/16/2023 1049    LIPASE 113 02/04/2020 1253    LIPASE 91 09/16/2019 1500       Radiology:  [unfilled]      Assessment & Plan   Assessment:    Hematochezia   Acute blood loss anemia   Diverticulosis    Plan:   Suspect self limited diverticular bleeding.  Pt had colonoscopy in August of this year, no plans for repeat at this time given improving Hb and lack of ongoing sx  She will need close f/u with GI at  as she is due for outpt colonoscopy for surveillance of piecemeal polypectomy site   Can start GI soft diet.   Please reconsult if any further bleeding.      I discussed the patient's findings and my recommendations with patient.          Sacha Love M.D.  [facility]  [address] [place] [address]  Office: [phone]

## 2024-12-06 NOTE — PROGRESS NOTES
Name: Floridalma Siegel ADMIT: 2024   : 1970  PCP: Claudia Poon APRN    MRN: [id number] LOS: 1 days   AGE/SEX: 54 y.o. female  ROOM: [facility]     Subjective   Subjective   No acute events. Patient denies new complaints. No family at bedside.    Objective   Objective   Vital Signs  Temp:  [97.3 °F (36.3 °C)-98.2 °F (36.8 °C)] 97.9 °F (36.6 °C)  Heart Rate:  [83-94] 94  Resp:  [16-18] 18  BP: (118-138)/(64-87) 127/85  SpO2:  [95 %-100 %] 100 %  on   ;   Device (Oxygen Therapy): room air  Body mass index is 34.06 kg/m².  Physical Exam  Vitals and nursing note reviewed.   Constitutional:       General: She is not in acute distress.     Appearance: She is not toxic-appearing or diaphoretic.   HENT:      Head: Normocephalic and atraumatic.      Nose: Nose normal.      Mouth/Throat:      Mouth: Mucous membranes are moist.      Pharynx: Oropharynx is clear.   Eyes:      Conjunctiva/sclera: Conjunctivae normal.      Pupils: Pupils are equal, round, and reactive to light.   Cardiovascular:      Rate and Rhythm: Normal rate and regular rhythm.      Pulses: Normal pulses.   Pulmonary:      Effort: Pulmonary effort is normal.   Abdominal:      General: Bowel sounds are normal. There is no distension.      Palpations: Abdomen is soft.      Tenderness: There is no abdominal tenderness.   Musculoskeletal:         General: No swelling or tenderness.      Cervical back: Neck supple.   Skin:     General: Skin is warm and dry.      Capillary Refill: Capillary refill takes less than 2 seconds.   Neurological:      General: No focal deficit present.      Mental Status: She is alert and oriented to person, place, and time.   Psychiatric:         Mood and Affect: Mood normal.         Behavior: Behavior normal.       Results Review     I reviewed the patient's new clinical results.  Results from last 7 days   Lab Units 24  0502 24  1530 24  0448   WBC 10*3/mm3 11.60* 12.52*  --    HEMOGLOBIN g/dL 9.2*  9.6* 8.4*   PLATELETS 10*3/mm3 259 243  --      Results from last 7 days   Lab Units 12/06/24  0502 12/05/24  1539   SODIUM mmol/L 138 139   POTASSIUM mmol/L 4.0 4.1   CHLORIDE mmol/L 109* 107   CO2 mmol/L 22.0 21.0*   BUN mg/dL 34* 41*   CREATININE mg/dL 2.38* 2.62*   GLUCOSE mg/dL 126* 173*   EGFR mL/min/1.73 23.6* 21.0*     Results from last 7 days   Lab Units 12/05/24  1539   ALBUMIN g/dL 3.6   BILIRUBIN mg/dL 0.5   ALK PHOS U/L 57   AST (SGOT) U/L 19   ALT (SGPT) U/L 18     Results from last 7 days   Lab Units 12/06/24  0502 12/05/24  1539   CALCIUM mg/dL 8.3* 8.4*   ALBUMIN g/dL  --  3.6       Hemoglobin A1C   Date/Time Value Ref Range Status   12/06/2024 0502 8.10 (H) 4.80 - 5.60 % Final     Glucose   Date/Time Value Ref Range Status   12/06/2024 1528 146 (H) 70 - 130 mg/dL Final   12/06/2024 1027 228 (H) 70 - 130 mg/dL Final   12/06/2024 0625 124 70 - 130 mg/dL Final   12/05/2024 2021 172 (H) 70 - 130 mg/dL Final   12/05/2024 1615 165 (H) 70 - 130 mg/dL Final       No radiology results for the last day    I have personally reviewed all medications:  Scheduled Medications  allopurinol, 100 mg, Oral, Daily  amLODIPine, 10 mg, Oral, Daily  calcitriol, 0.5 mcg, Oral, Daily  carvedilol, 12.5 mg, Oral, BID With Meals  hydrALAZINE, 100 mg, Oral, BID  insulin glargine, 10 Units, Subcutaneous, Nightly  insulin regular, 2-9 Units, Subcutaneous, Q6H  levothyroxine, 150 mcg, Oral, Q AM  magnesium oxide, 400 mg, Oral, Daily  pantoprazole, 40 mg, Intravenous, Q12H  rosuvastatin, 5 mg, Oral, Daily  sodium chloride, 10 mL, Intravenous, Q12H    Infusions   Diet  Diet: Gastrointestinal; Fiber-Restricted; Texture: Soft to Chew (NDD 3); Soft to Chew: Whole Meat; Fluid Consistency: Thin (IDDSI 0)    I have personally reviewed:  [x]  Laboratory   []  Microbiology   [x]  Radiology   [x]  EKG/Telemetry  []  Cardiology/Vascular   []  Pathology    [x]  Records    Assessment/Plan     Active Hospital Problems    Diagnosis  POA    **GI  bleed [K92.2]  Yes    Lower GI bleed [K92.2]  Yes    Essential hypertension [I10]  Yes    Hyperlipidemia [E78.5]  Yes    GERD without esophagitis [K21.9]  Yes    Type 2 diabetes mellitus [E11.9]  Yes      Resolved Hospital Problems   No resolved problems to display.   Acute Lower GI Bleeding  - likely diverticular, no evidence of ongoing bleeding  - monitor H&H and transfuse as needed  - appreciate GI recs, she can follow up with [facility] as scheduled    HTN  - BP acceptable  - continue norvasc, coreg, and hydralazine    Type 2 DM  - BG acceptable  - continue lantus and cover with ssi/hypoglycemia protocol    GERD  - continue ppi, switch to PO      SCDs for DVT prophylaxis.  Full code.  Discussed with patient, nursing staff, and care team on multidisciplinary rounds.  Anticipate discharge home tomorrow.  Expected Discharge Date: 12/7/2024; Expected Discharge Time:       José Luis Rob MD  [facility]  12/06/24  18:28 EST

## 2024-12-07 VITALS
DIASTOLIC BLOOD PRESSURE: 76 MMHG | SYSTOLIC BLOOD PRESSURE: 140 MMHG | TEMPERATURE: 97.9 F | BODY MASS INDEX: 33.95 KG/M2 | HEART RATE: 80 BPM | WEIGHT: 224 LBS | RESPIRATION RATE: 18 BRPM | HEIGHT: 68 IN | OXYGEN SATURATION: 100 %

## 2024-12-07 LAB
ANION GAP SERPL CALCULATED.3IONS-SCNC: 9 MMOL/L (ref 5–15)
BASOPHILS # BLD AUTO: 0.03 10*3/MM3 (ref 0–0.2)
BASOPHILS NFR BLD AUTO: 0.4 % (ref 0–1.5)
BUN SERPL-MCNC: 32 MG/DL (ref 6–20)
BUN/CREAT SERPL: 12.7 (ref 7–25)
CALCIUM SPEC-SCNC: 8.1 MG/DL (ref 8.6–10.5)
CHLORIDE SERPL-SCNC: 113 MMOL/L (ref 98–107)
CO2 SERPL-SCNC: 22 MMOL/L (ref 22–29)
CREAT SERPL-MCNC: 2.51 MG/DL (ref 0.57–1)
DEPRECATED RDW RBC AUTO: 45.6 FL (ref 37–54)
EGFRCR SERPLBLD CKD-EPI 2021: 22.1 ML/MIN/1.73
EOSINOPHIL # BLD AUTO: 0.14 10*3/MM3 (ref 0–0.4)
EOSINOPHIL NFR BLD AUTO: 1.8 % (ref 0.3–6.2)
ERYTHROCYTE [DISTWIDTH] IN BLOOD BY AUTOMATED COUNT: 13.5 % (ref 12.3–15.4)
GLUCOSE BLDC GLUCOMTR-MCNC: 125 MG/DL (ref 70–130)
GLUCOSE BLDC GLUCOMTR-MCNC: 183 MG/DL (ref 70–130)
GLUCOSE SERPL-MCNC: 130 MG/DL (ref 65–99)
HCT VFR BLD AUTO: 23.3 % (ref 34–46.6)
HCT VFR BLD AUTO: 26.9 % (ref 34–46.6)
HGB BLD-MCNC: 7.7 G/DL (ref 12–15.9)
HGB BLD-MCNC: 8.8 G/DL (ref 12–15.9)
IMM GRANULOCYTES # BLD AUTO: 0.05 10*3/MM3 (ref 0–0.05)
IMM GRANULOCYTES NFR BLD AUTO: 0.6 % (ref 0–0.5)
LYMPHOCYTES # BLD AUTO: 1.49 10*3/MM3 (ref 0.7–3.1)
LYMPHOCYTES NFR BLD AUTO: 19.2 % (ref 19.6–45.3)
MCH RBC QN AUTO: 30.6 PG (ref 26.6–33)
MCHC RBC AUTO-ENTMCNC: 33 G/DL (ref 31.5–35.7)
MCV RBC AUTO: 92.5 FL (ref 79–97)
MONOCYTES # BLD AUTO: 0.77 10*3/MM3 (ref 0.1–0.9)
MONOCYTES NFR BLD AUTO: 9.9 % (ref 5–12)
NEUTROPHILS NFR BLD AUTO: 5.29 10*3/MM3 (ref 1.7–7)
NEUTROPHILS NFR BLD AUTO: 68.1 % (ref 42.7–76)
NRBC BLD AUTO-RTO: 0 /100 WBC (ref 0–0.2)
PLATELET # BLD AUTO: 203 10*3/MM3 (ref 140–450)
PMV BLD AUTO: 10.4 FL (ref 6–12)
POTASSIUM SERPL-SCNC: 4 MMOL/L (ref 3.5–5.2)
RBC # BLD AUTO: 2.52 10*6/MM3 (ref 3.77–5.28)
SODIUM SERPL-SCNC: 144 MMOL/L (ref 136–145)
WBC NRBC COR # BLD AUTO: 7.77 10*3/MM3 (ref 3.4–10.8)

## 2024-12-07 PROCEDURE — 80048 BASIC METABOLIC PNL TOTAL CA: CPT | Performed by: INTERNAL MEDICINE

## 2024-12-07 PROCEDURE — 85025 COMPLETE CBC W/AUTO DIFF WBC: CPT | Performed by: INTERNAL MEDICINE

## 2024-12-07 PROCEDURE — 85014 HEMATOCRIT: CPT | Performed by: INTERNAL MEDICINE

## 2024-12-07 PROCEDURE — 85018 HEMOGLOBIN: CPT | Performed by: INTERNAL MEDICINE

## 2024-12-07 PROCEDURE — 63710000001 INSULIN REGULAR HUMAN PER 5 UNITS: Performed by: HOSPITALIST

## 2024-12-07 PROCEDURE — G0378 HOSPITAL OBSERVATION PER HR: HCPCS

## 2024-12-07 PROCEDURE — 82948 REAGENT STRIP/BLOOD GLUCOSE: CPT

## 2024-12-07 RX ORDER — ASPIRIN 81 MG/1
81 TABLET ORAL DAILY
Start: 2024-12-11

## 2024-12-07 RX ADMIN — AMLODIPINE BESYLATE 10 MG: 10 TABLET ORAL at 10:03

## 2024-12-07 RX ADMIN — INSULIN HUMAN 2 UNITS: 100 INJECTION, SOLUTION PARENTERAL at 12:30

## 2024-12-07 RX ADMIN — CALCITRIOL 0.5 MCG: 0.5 CAPSULE, LIQUID FILLED ORAL at 10:04

## 2024-12-07 RX ADMIN — HYDRALAZINE HYDROCHLORIDE 100 MG: 50 TABLET ORAL at 10:03

## 2024-12-07 RX ADMIN — PANTOPRAZOLE SODIUM 40 MG: 40 TABLET, DELAYED RELEASE ORAL at 06:20

## 2024-12-07 RX ADMIN — MAGNESIUM OXIDE TAB 400 MG (241.3 MG ELEMENTAL MG) 400 MG: 400 (241.3 MG) TAB at 10:03

## 2024-12-07 RX ADMIN — ROSUVASTATIN CALCIUM 5 MG: 5 TABLET, FILM COATED ORAL at 10:04

## 2024-12-07 RX ADMIN — ALLOPURINOL 100 MG: 100 TABLET ORAL at 10:03

## 2024-12-07 RX ADMIN — CARVEDILOL 12.5 MG: 12.5 TABLET, FILM COATED ORAL at 10:03

## 2024-12-07 RX ADMIN — LEVOTHYROXINE SODIUM 150 MCG: 0.15 TABLET ORAL at 06:20

## 2024-12-07 RX ADMIN — INSULIN HUMAN 4 UNITS: 100 INJECTION, SOLUTION PARENTERAL at 00:01

## 2024-12-07 NOTE — PLAN OF CARE
Goal Outcome Evaluation:      Pt resting in bed, Aox4 , room air. SR on tele . VSS. No sign of acute distress noted. Plan of care is ongoing.

## 2024-12-07 NOTE — DISCHARGE SUMMARY
"Date of Admission: 12/5/2024  Date of Discharge:  12/7/2024  Primary Care Physician: Claudia Poon APRN     Discharge Diagnosis:  Active Hospital Problems    Diagnosis  POA    **GI bleed [K92.2]  Yes    Lower GI bleed [K92.2]  Yes    Essential hypertension [I10]  Yes    Hyperlipidemia [E78.5]  Yes    GERD without esophagitis [K21.9]  Yes    Type 2 diabetes mellitus [E11.9]  Yes      Resolved Hospital Problems   No resolved problems to display.       Presenting Problem/History of Present Illness:  GI bleed [K92.2]  Lower GI bleed [K92.2]     Hospital Course:  The patient is a 54 y.o. female with a history of HTN, Type 2 DM, and GERD who presented with rectal bleeding. Please see admission H&P for further details. She was evaluated by gastroenterology. Her bleeding stopped and she was observed and remained stable. GI did not recommend endoscopy at this time. She is medically stable and will be discharged home. She should keep follow up with her gastroenterologist at [facility]-she states she is working on scheduling an appointment.   Of note she had some non-specific nodularity noted in her anterior pelvic wall on CT of the abdomen and pelvis done at transferring facility. This can be worked up further as an outpatient.    Exam Today:  Blood pressure 140/76, pulse 80, temperature 97.9 °F (36.6 °C), temperature source Oral, resp. rate 18, height 172.7 cm (68\"), weight 102 kg (224 lb), SpO2 100%, not currently breastfeeding.  Vitals and nursing note reviewed.   Constitutional:       General: She is not in acute distress.     Appearance: She is not toxic-appearing or diaphoretic.   HENT:      Head: Normocephalic and atraumatic.      Nose: Nose normal.      Mouth/Throat:      Mouth: Mucous membranes are moist.      Pharynx: Oropharynx is clear.   Eyes:      Conjunctiva/sclera: Conjunctivae normal.      Pupils: Pupils are equal, round, and reactive to light.   Cardiovascular:      Rate and Rhythm: Normal rate and regular " rhythm.      Pulses: Normal pulses.   Pulmonary:      Effort: Pulmonary effort is normal.   Abdominal:      General: Bowel sounds are normal. There is no distension.      Palpations: Abdomen is soft.      Tenderness: There is no abdominal tenderness.   Musculoskeletal:         General: No swelling or tenderness.      Cervical back: Neck supple.   Skin:     General: Skin is warm and dry.      Capillary Refill: Capillary refill takes less than 2 seconds.   Neurological:      General: No focal deficit present.      Mental Status: She is alert and oriented to person, place, and time.   Psychiatric:         Mood and Affect: Mood normal.         Behavior: Behavior normal.    Consults:   Consults       Date and Time Order Name Status Description    12/5/2024  2:05 PM Inpatient Gastroenterology Consult               Discharge Disposition:  Home or Self Care    Discharge Medications:     Discharge Medications        Changes to Medications        Instructions Start Date   aspirin 81 MG EC tablet  What changed: These instructions start on December 11, 2024. If you are unsure what to do until then, ask your doctor or other care provider.   81 mg, Oral, Daily   Start Date: December 11, 2024            Continue These Medications        Instructions Start Date   allopurinol 100 MG tablet  Commonly known as: ZYLOPRIM   100 mg, Daily      amLODIPine 10 MG tablet  Commonly known as: NORVASC   10 mg, Daily      calcitriol 0.5 MCG capsule  Commonly known as: ROCALTROL   0.5 mcg, Daily      carvedilol 12.5 MG tablet  Commonly known as: COREG   12.5 mg, 2 Times Daily With Meals      cyclobenzaprine 10 MG tablet  Commonly known as: FLEXERIL   10 mg, 3 Times Daily PRN      empagliflozin 25 MG tablet tablet  Commonly known as: JARDIANCE   25 mg, Daily      fluticasone 50 MCG/ACT nasal spray  Commonly known as: FLONASE   2 sprays, As Needed      furosemide 20 MG tablet  Commonly known as: LASIX   20 mg, Daily      hydrALAZINE 10 MG  tablet  Commonly known as: APRESOLINE   100 mg, 2 Times Daily      insulin detemir 100 UNIT/ML injection  Commonly known as: LEVEMIR   30 Units, Nightly      levothyroxine 150 MCG tablet  Commonly known as: SYNTHROID, LEVOTHROID   150 mcg, Every Early Morning      magnesium oxide 400 tablet tablet  Commonly known as: MAG-OX   400 mg, Daily      multivitamin tablet tablet   1 tablet, Daily      QC TUMERIC COMPLEX PO   Take  by mouth.      rosuvastatin 5 MG tablet  Commonly known as: CRESTOR   5 mg, Daily      spironolactone 50 MG tablet  Commonly known as: ALDACTONE   50 mg, Daily      Trulicity 0.75 MG/0.5ML solution auto-injector  Generic drug: Dulaglutide   0.75 mg, Weekly               Discharge Diet:   Diet Instructions       Diet: Gastrointestinal Diets; Fiber-Restricted; Soft to Chew (NDD 3); Whole Meat; Thin (IDDSI 0)      Discharge Diet: Gastrointestinal Diets    Gastrointestinal Diet: Fiber-Restricted    Texture: Soft to Chew (NDD 3)    Soft to Chew: Whole Meat    Fluid Consistency: Thin (IDDSI 0)            Activity at Discharge:   Activity Instructions       Activity as Tolerated              Follow-up Appointments:  No future appointments.  Additional Instructions for the Follow-ups that You Need to Schedule       Discharge Follow-up with PCP   As directed       Currently Documented PCP:    Claudia Poon APRN    PCP Phone Number:    [phone]     Follow Up Details: 1 week        Discharge Follow-up with Specialty: Gastroenterology ([facility])   As directed      Specialty: Gastroenterology ([facility])   Follow Up Details: as scheduled                   José Luis Rob MD  12/07/24  13:08 EST    Time Spent on Discharge Activities: Greater than 30 minutes.

## 2024-12-09 NOTE — CASE MANAGEMENT/SOCIAL WORK
Case Management Discharge Note      Final Note: Home; self-care. Jillian [facility]         Selected Continued Care - Discharged on 12/7/2024 Admission date: 12/5/2024 - Discharge disposition: Home or Self Care      Destination    No services have been selected for the patient.                Durable Medical Equipment    No services have been selected for the patient.                Dialysis/Infusion    No services have been selected for the patient.                Home Medical Care    No services have been selected for the patient.                Therapy    No services have been selected for the patient.                Community Resources    No services have been selected for the patient.                Community & DME    No services have been selected for the patient.                         Final Discharge Disposition Code: 01 - home or self-care